# Patient Record
Sex: FEMALE | Race: WHITE
[De-identification: names, ages, dates, MRNs, and addresses within clinical notes are randomized per-mention and may not be internally consistent; named-entity substitution may affect disease eponyms.]

---

## 2017-07-21 ENCOUNTER — HOSPITAL ENCOUNTER (EMERGENCY)
Dept: HOSPITAL 17 - NEPD | Age: 42
Discharge: HOME | End: 2017-07-21
Payer: COMMERCIAL

## 2017-07-21 VITALS
DIASTOLIC BLOOD PRESSURE: 94 MMHG | RESPIRATION RATE: 14 BRPM | TEMPERATURE: 98 F | OXYGEN SATURATION: 99 % | HEART RATE: 68 BPM | SYSTOLIC BLOOD PRESSURE: 164 MMHG

## 2017-07-21 VITALS — WEIGHT: 149.91 LBS | HEIGHT: 64 IN | BODY MASS INDEX: 25.59 KG/M2

## 2017-07-21 DIAGNOSIS — O00.80: Primary | ICD-10-CM

## 2017-07-21 PROCEDURE — 76700 US EXAM ABDOM COMPLETE: CPT

## 2017-07-21 PROCEDURE — 76817 TRANSVAGINAL US OBSTETRIC: CPT

## 2017-07-21 PROCEDURE — 84703 CHORIONIC GONADOTROPIN ASSAY: CPT

## 2017-07-21 NOTE — PD
Physical Exam


Narrative


Received sign out from previous team to follow up on ultrasound results.





This is a 43yo F who has been seeing fertility specialist Dr. Jackson and saw 

him this morning.  Pt was sent here for a second ultrasound to rule out ectopic 

pregnancy.  On exam, abdomen is soft, NT/ND.  No rebound tenderness or 

guarding.  Pt has been having some vaginal spotting for 2 weeks that Dr. Jackson has been following on.   US showed left cornual gestation.  Probable 

fetal demise.  I discussed with Dr. Jackson and he said to have the patient 

follow up with him in her appointment on Monday.  Pt informed of the ultrasound 

results. Pt has no abdominal pain right now and will return to the ED 

immediately if worsening symptoms.





Data


Data


Last Documented VS





Vital Signs








  Date Time  Temp Pulse Resp B/P Pulse Ox O2 Delivery O2 Flow Rate FiO2


 


7/21/17 14:43 98.0 68 14 164/94 99   








Orders





 Ua Includes Microscopic (7/21/17 15:27)


Ed Urine Pregnancytest Poc (7/21/17 15:27)


Us Pelvis (Ques Pr/Ect)W Trans (7/21/17 )








MDM


Supervised Visit with AGNES:  No


Diagnosis





 Primary Impression:  


 Cornual pregnancy


Patient Instructions:  General Instructions


Departure Forms:  Tests/Procedures





***Additional Instruction:


Please follow up with Dr. Jackson on your appointment time.  Please return to 

the ED immediately if your symptoms worsen.


***Med/Other Pt SpecificInfo:  No Change to Meds


Disposition:  01 DISCHARGE HOME


Condition:  Stable








AcDenisseLucila DO Jul 21, 2017 17:20

## 2017-07-21 NOTE — RADRPT
EXAM DATE/TIME:  2017 16:26 

 

[facility] COMPARISON:     

No previous studies available for comparison.

        

 

 

INDICATIONS :     

Abnormal ultrasound at OB office.

                     

 

LAB(S):     

 

Beta-hC on 17

                     

 

MEDICAL HISTORY :     

Pregnancy. Hypothyroidism.   

 

SURGICAL HISTORY :     

 section.     

 

ENCOUNTER:     

Initial

 

ACUITY:     

1 day

 

PAIN SCORE:     

0/10

 

LOCATION:     

Bilateral pelvis 

                     

MEASUREMENTS:     

 

LEFT OVARY:                             

1.8 x 1.6 x 0.9 cm

 

UTERUS:                                  

9.9 x 5.7 x 5.2 cm

 

ENDOMETRIAL STRIPE:      

16 mm

 

RIGHT OVARY:                      

3.4 x 2.3 x 2.1 cm

 

FREE FLUID:                           

No  

 

CROWN RUMP LENGTH:     

0.49 cm = 6 WKS 1 DAYS

 

FHR:                                         

Non visualized. BPM

 

FINDINGS:     

 

UTERUS:     

A gestational sac is present which appears to be cornual in location on the left. A fetal pole is pre
sent with crown rump length of 4.9 mm yielding estimated age of 6 weeks one day however no cardiac ac
tivity is detected.

 

RIGHT OVARY:     

Ovary contains no mass or significant cystic lesion.  

 

LEFT OVARY:     

Ovary contains no mass or significant cystic lesion.  

 

MISCELLANEOUS:     

No free fluid.  

 

CONCLUSION:     

Left cornual gestation. Probable fetal demise.

 

 

 

 Shubham Wall MD on 2017 at 17:24           

Board Certified Radiologist.

 This report was verified electronically.

## 2017-07-21 NOTE — PD
HPI


Chief Complaint:  Pregnancy Related Problem


Time Seen by Provider:  15:16


Travel History


International Travel<30 days:  No


Contact w/Intl Traveler<30days:  No


Traveled to known affect area:  No





History of Present Illness


HPI


42-year-old female came to the emergency room with history of possible ectopic 

pregnancy.  Patient has been seeing a fertility specialist and got pregnant 

however 2 weeks ago on the ultrasound it was noticed that the fetus did not 

have any heartbeat.  Currently is measuring at 6 weeks in few days.  However 

patient has been having some right sided abdominal pain.  Her fertility 

specialist thinks she may have an ectopic and has sent her here for a second 

opinion.  Patient does not seem to be any significant distress.  She told me 

her blood group is A+.  Her last beta hCG was done few days ago and was 46,000.

  She goes to see Dr. Jackson





Pending sale to [facility]


Past Medical History


*** Narrative Medical


List of his past medical, surgical, social and family history was reviewed from 

the nursing note.


Pregnant?:  Pregnant


LMP:  7 WEEKS





Social History


Tobacco Use:  Yes





Allergies-Medications


(Allergen,Severity, Reaction):  


Coded Allergies:  


     No Known Allergies (Unverified , 17)


Comments


No known drug allergies.


Reported Meds & Prescriptions





Reported Meds & Active Scripts


Active


Reported


Synthroid (Levothyroxine Sodium) 75 Mcg Tab 75 Mcg PO DAILY





Narrative Medication


List of his home medication nursing





Review of Systems


Except as stated in HPI:  all other systems reviewed are Neg





Physical Exam


Narrative


GENERAL: Awake, alert, no obvious distress


SKIN: Focused skin assessment warm/dry.


HEAD: Atraumatic. Normocephalic. 


EYES: Pupils equal and round. No scleral icterus. No injection or drainage. 


ENT: No nasal bleeding or discharge.  Mucous membranes pink and moist.


NECK: Trachea midline. No JVD. 


CARDIOVASCULAR: Regular rate and rhythm.  No murmur appreciated.


RESPIRATORY: No accessory muscle use. Clear to auscultation. Breath sounds 

equal bilaterally. 


GASTROINTESTINAL: Abdomen soft, non-tender, nondistended. Hepatic and splenic 

margins not palpable. 


MUSCULOSKELETAL: No obvious deformities. No clubbing.  No cyanosis.  No edema. 


NEUROLOGICAL: Awake and alert. No obvious cranial nerve deficits.  Motor 

grossly within normal limits. Normal speech.


PSYCHIATRIC: Appropriate mood and affect; insight and judgment normal.





Data


Data


Last Documented VS





Vital Signs








  Date Time  Temp Pulse Resp B/P Pulse Ox O2 Delivery O2 Flow Rate FiO2


 


7/21/17 14:43 98.0 68 14 164/94 99   








Orders





 Ed Urine Pregnancytest Poc (17 15:27)


Us Pelvis (Ques Pr/Ect)W Trans (17 )








MDM


Medical Decision Making


Medical Screen Exam Complete:  Yes


Emergency Medical Condition:  Yes


Medical Record Reviewed:  Yes


Differential Diagnosis


Ectopic pregnancy, missed , incomplete 


Narrative Course


3:58 PM awaiting for the ultrasound to be done and resulted.





4:44 PM Dr. Jackson wanted to be called after the patient was evaluated which I 

did.  As per him he had done the bedside ultrasound in his office and noticed a 

fetal pole but the sac was in the right corner of the uterus and his concern 

was interstitial ectopic.  He did not notice any heart beat which she should 

seek by this time.  So he basically send the patient in for a second set of 

eyes to look at the ultrasound.  Case will be signed over to the oncoming ER 

physician at 5 PM.  She'll have to call him and discussed the plan based on the 

ultrasound report.





Procedures


EKG Prior to Arrival:  Jose Evans MD 2017 15:16

## 2018-02-06 ENCOUNTER — APPOINTMENT (RX ONLY)
Dept: URBAN - METROPOLITAN AREA CLINIC 52 | Facility: CLINIC | Age: 43
Setting detail: DERMATOLOGY
End: 2018-02-06

## 2018-02-06 DIAGNOSIS — L81.0 POSTINFLAMMATORY HYPERPIGMENTATION: ICD-10-CM

## 2018-02-06 DIAGNOSIS — L72.8 OTHER FOLLICULAR CYSTS OF THE SKIN AND SUBCUTANEOUS TISSUE: ICD-10-CM

## 2018-02-06 PROBLEM — E03.9 HYPOTHYROIDISM, UNSPECIFIED: Status: ACTIVE | Noted: 2018-02-06

## 2018-02-06 PROCEDURE — ? COUNSELING

## 2018-02-06 PROCEDURE — 99213 OFFICE O/P EST LOW 20 MIN: CPT | Mod: 25

## 2018-02-06 PROCEDURE — ? INTRALESIONAL KENALOG

## 2018-02-06 PROCEDURE — ? PRESCRIPTION

## 2018-02-06 PROCEDURE — 11900 INJECT SKIN LESIONS </W 7: CPT

## 2018-02-06 RX ORDER — DOXYCYCLINE HYCLATE 100 MG/1
100MG TABLET, COATED ORAL BID
Qty: 20 | Refills: 0 | Status: ERX | COMMUNITY
Start: 2018-02-06

## 2018-02-06 RX ADMIN — DOXYCYCLINE HYCLATE 100MG: 100 TABLET, COATED ORAL at 13:33

## 2018-02-06 ASSESSMENT — LOCATION DETAILED DESCRIPTION DERM
LOCATION DETAILED: LEFT BUTTOCK
LOCATION DETAILED: LEFT INFERIOR LATERAL LOWER BACK

## 2018-02-06 ASSESSMENT — LOCATION SIMPLE DESCRIPTION DERM
LOCATION SIMPLE: LEFT LOWER BACK
LOCATION SIMPLE: LEFT BUTTOCK

## 2018-02-06 ASSESSMENT — LOCATION ZONE DERM: LOCATION ZONE: TRUNK

## 2018-02-06 NOTE — PROCEDURE: INTRALESIONAL KENALOG
Include Z78.9 (Other Specified Conditions Influencing Health Status) As An Associated Diagnosis?: No
Total Volume Injected (Ccs- Only Use Numbers And Decimals): 0.3
Concentration Of Solution Injected (Mg/Ml): 10.0
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
Kenalog Preparation: Kenalog
X Size Of Lesion In Cm (Optional): 0
Detail Level: Simple
Consent: The risks of atrophy were reviewed with the patient.

## 2018-06-27 NOTE — HPI: SKIN LESION
cell lavonne with wife/with patient
How Severe Is Your Skin Lesion?: moderate
Has Your Skin Lesion Been Treated?: not been treated
Is This A New Presentation, Or A Follow-Up?: Skin Lesion